# Patient Record
Sex: FEMALE | Race: WHITE
[De-identification: names, ages, dates, MRNs, and addresses within clinical notes are randomized per-mention and may not be internally consistent; named-entity substitution may affect disease eponyms.]

---

## 2018-07-01 ENCOUNTER — HOSPITAL ENCOUNTER (EMERGENCY)
Dept: HOSPITAL 25 - UCCORT | Age: 68
Discharge: HOME | End: 2018-07-01
Payer: MEDICARE

## 2018-07-01 VITALS — SYSTOLIC BLOOD PRESSURE: 141 MMHG | DIASTOLIC BLOOD PRESSURE: 72 MMHG

## 2018-07-01 DIAGNOSIS — N30.00: Primary | ICD-10-CM

## 2018-07-01 PROCEDURE — 87077 CULTURE AEROBIC IDENTIFY: CPT

## 2018-07-01 PROCEDURE — 81003 URINALYSIS AUTO W/O SCOPE: CPT

## 2018-07-01 PROCEDURE — 99212 OFFICE O/P EST SF 10 MIN: CPT

## 2018-07-01 PROCEDURE — G0463 HOSPITAL OUTPT CLINIC VISIT: HCPCS

## 2018-07-01 PROCEDURE — 87086 URINE CULTURE/COLONY COUNT: CPT

## 2018-07-01 PROCEDURE — 87186 SC STD MICRODIL/AGAR DIL: CPT

## 2018-07-01 NOTE — UC
Complaint Female HPI





- HPI Summary


HPI Summary: 


Worsening urinary frequency in the last 2 days with lower back and abdominal 

pain. No fevers.





- History Of Current Complaint


Stated Complaint: URINARY COMPLAINT


Hx Obtained From: Patient


Hx Last Menstrual Period: age 40s


Onset/Duration: Gradual Onset, Lasting Days - 2, Worse Since - onset


Timing: Constant


Severity Initially: Mild


Severity Currently: Moderate


Pain Intensity: 5


Character: Dull, Burning, Cramping


Aggravating Factor(s): Urination


Associated Signs And Symptoms: Positive: Back Pain





- Allergies/Home Medications


Allergies/Adverse Reactions: 


 Allergies











Allergy/AdvReac Type Severity Reaction Status Date / Time


 


seasonal Allergy  Congestion Uncoded 07/01/18 21:13











Home Medications: 


 Home Medications





Cranberry 400 mg PO DAILY 07/01/18 [History Confirmed 07/01/18]











PMH/Surg Hx/FS Hx/Imm Hx


Respiratory History: Asthma





- Surgical History


Surgical History: Yes


Surgery Procedure, Year, and Place: TUBAL.  POLYP REMOVED FROM COLONOSCOPY WITH 

PARTIAL INTESTINAL REMOVAL.  BREAST BIOPSY PT UNSURE OF SIDE





- Family History


Known Family History: Positive: Hypertension, Diabetes





- Social History


Occupation: Retired


Lives: Alone


Alcohol Use: Occasionally


Substance Use Type: None


Smoking Status (MU): Never Smoked Tobacco





Review of Systems


Genitourinary: Frequency, Urgency


Musculoskeletal: Myalgia


Is Patient Immunocompromised?: No


All Other Systems Reviewed And Are Negative: Yes





Physical Exam


Triage Information Reviewed: Yes


Appearance: Well-Appearing, No Pain Distress, Well-Nourished


Vital Signs: 


 Initial Vital Signs











Temp  99.2 F   07/01/18 21:15


 


Pulse  86   07/01/18 21:15


 


Resp  18   07/01/18 21:15


 


BP  141/72   07/01/18 21:15


 


Pulse Ox  98   07/01/18 21:15











Vital Signs Reviewed: Yes


Eyes: Positive: Conjunctiva Clear


Neck exam: Normal


Respiratory Exam: Normal


Cardiovascular Exam: Normal


Abdomen Description: Negative: Nontender - suprapubic tenderness, CVA 

Tenderness (R), CVA Tenderness (L)


Bowel Sounds: Positive: Present


Musculoskeletal Exam: Normal


Neurological Exam: Normal


Psychological Exam: Normal


Skin Exam: Normal





 Complaint Female Dx





- Differential Dx/Diagnosis


Differential Diagnosis/HQI/PQRI: Appendicitis, Ureteral Stone, Urinary Tract 

Infection


Provider Diagnoses: Acute cystitis





Discharge





- Sign-Out/Discharge


Documenting (check all that apply): Discharge/Admit/Transfer





- Discharge Plan


Condition: Stable


Disposition: HOME


Prescriptions: 


Nitrofurantoin Monohyd/M-Cryst [Macrobid 100 mg Capsule] 100 mg PO BID #14 cap


Phenazopyridine 200 mg (NF) [Pyridium 200 MG tab *] 200 mg PO TID PRN #6 tab


 PRN Reason: UTI symptoms


Patient Education Materials:  Urinary Tract Infection in Women (ED), 

Phenazopyridine (By mouth), Nitrofurantoin Combination (By mouth)


Referrals: 


MARY Fair [Primary Care Provider] - 





- Billing Disposition and Condition


Condition: STABLE


Disposition: Home